# Patient Record
Sex: MALE | Race: WHITE
[De-identification: names, ages, dates, MRNs, and addresses within clinical notes are randomized per-mention and may not be internally consistent; named-entity substitution may affect disease eponyms.]

---

## 2020-03-11 ENCOUNTER — HOSPITAL ENCOUNTER (EMERGENCY)
Dept: HOSPITAL 41 - JD.ED | Age: 55
Discharge: HOME | End: 2020-03-11
Payer: COMMERCIAL

## 2020-03-11 DIAGNOSIS — J40: ICD-10-CM

## 2020-03-11 DIAGNOSIS — F17.210: ICD-10-CM

## 2020-03-11 DIAGNOSIS — J18.9: Primary | ICD-10-CM

## 2020-03-11 NOTE — CR
Chest: PA and lateral views of the chest were obtained.

 

Comparison: No prior chest x-ray is available.

 

Heart is mildly enlarged.  Increased lung markings are seen believed 

to represent pulmonary vascular congestion.  Mild asymmetric increased

 density within left lung base is seen either due to atelectasis or 

possibly very minimal early pneumonia.  Probable small bilateral 

pleural effusions.

 

Impression:

1.  Findings suspicious for mild CHF.

2.  Minimal increased density of the left base either due to 

atelectasis or minimal area of developing pneumonia.  Please correlate

 with the patient's symptoms.

 

Diagnostic code #3

 

This report was dictated in [place] Standard Time

## 2020-03-11 NOTE — EDM.PDOC
ED HPI GENERAL MEDICAL PROBLEM





- General


Chief Complaint: Respiratory Problem


Stated Complaint: SOB


Time Seen by Provider: 03/11/20 06:58


Source of Information: Reports: Patient, RN Notes Reviewed





- History of Present Illness


INITIAL COMMENTS - FREE TEXT/NARRATIVE: 





54-year-old male comes in with cough, shortness of breath.  He did have onset 

of some type of upper respiratory infection about a week ago with cough and 

what sounds like chills, possible fever.  He was ill for several days and then 

did seem to be getting better.  He states he started coughing more during the 

night and again this morning  Cough has been nonproductive.  He is concerned 

about worsening shortness of breath.  He states that he is especially more 

shortness of breath with exertion.  He does have fairly longstanding smoking 

history initially stating that he started 20 years ago but then admitted he 

started in high school which would be more like 35 years ago.  Does work 

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 

had a lot of cold and weather exposure as well.  He did not get a flu shot last 

fall.





- Related Data


 Allergies











Allergy/AdvReac Type Severity Reaction Status Date / Time


 


No Known Allergies Allergy   Verified 03/11/20 06:52











Home Meds: 


 Home Meds





. [No Known Home Meds]  03/11/20 [History]











Past Medical History





- Past Health History


Medical/Surgical History: Denies Medical/Surgical History





Social & Family History





- Tobacco Use


Smoking Status *Q: Current Some Day Smoker


Years of Tobacco use: 20


Packs/Tins Daily: 0.2





ED ROS GENERAL





- Review of Systems


Review Of Systems: See Below


Constitutional: Reports: Fever, Chills


HEENT: Denies: Rhinitis, Sinus Problem, Throat Pain


Respiratory: Reports: Shortness of Breath, Wheezing, Cough (Assubel), Sputum


Cardiovascular: Denies: Chest Pain (And)


GI/Abdominal: Denies: Abdominal Pain, Nausea, Vomiting


Skin: Reports: No Symptoms





ED EXAM, GENERAL





- Physical Exam


Exam: See Below


General Appearance: Alert, No Apparent Distress


Nose: Normal Inspection


Throat/Mouth: Normal Inspection, Normal Oropharynx


Head: Atraumatic.  No: Facial Swelling


Neck: Supple, Full Range of Motion


Respiratory/Chest: No Respiratory Distress, Lungs Clear, Normal Breath Sounds.  

No: Rhonchi, Wheezing


Cardiovascular: Tachycardia


Extremities: No: Pedal Edema, Leg Pain


Neurological: Alert, Oriented


Skin Exam: Warm, Dry, Normal Color





Course





- Vital Signs


Last Recorded V/S: 


 Last Vital Signs











Temp  97.2 F   03/11/20 06:52


 


Pulse  104 H  03/11/20 06:52


 


Resp  16   03/11/20 06:52


 


BP  142/109 H  03/11/20 06:52


 


Pulse Ox  98   03/11/20 07:21














- Orders/Labs/Meds


Orders: 


 Active Orders 24 hr











 Category Date Time Status


 


 RT Aerosol Therapy [RC] ASDIRECTED Care  03/11/20 07:13 Active


 


 CXR [Chest 2V] [CR] Stat Exams  03/11/20 07:13 Taken











Meds: 


Medications














Discontinued Medications














Generic Name Dose Route Start Last Admin





  Trade Name Whit  PRN Reason Stop Dose Admin


 


Albuterol/Ipratropium  3 ml  03/11/20 07:12  03/11/20 07:20





  Duoneb 3.0-0.5 Mg/3 Ml  NEB  03/11/20 07:13  3 ml





  ONETIME ONE   Administration





     





     





     





     














- Re-Assessments/Exams


Free Text/Narrative Re-Assessment/Exam: 





03/11/20 07:55


Chest x-ray suggestive for early pneumonia.  There is some increased infiltrate 

peribronchial on the right and also left lower lung field not actively coughing 

while I have been with him in the room.  Febrile.  Sats are good and moving air 

relatively well will prescribe an albuterol inhaler.  Strongly encouraged him 

to quit smoking.  Will treat with Zithromax and he does need follow-up in about 

1 week.





Departure





- Departure


Time of Disposition: 07:56


Disposition: Home, Self-Care 01


Clinical Impression: 


 Pneumonia, Bronchitis








- Discharge Information


Instructions:  Sepsis, Adult, Community-Acquired Pneumonia, Adult


Referrals: 


PCP,Not In Area [Primary Care Provider] - 


Forms:  ED Department Discharge, ED Return to Work/School Form


Additional Instructions: 


Albuterol inhaler 2 puffs every 4 to 6 hours as needed for wheezing or 

difficulty breathing, Zithromax antibiotic  500 milligrams today and then 250 

mg for the next 4 days.  Try very hard to stop smoking.  Vaporizer or steam as 

needed.  Follow-up at our [facility] medical clinic in about 1 week for recheck.   

Call [phone] for appointment.  Retiurn to  ED as needed.





Sepsis Event Note





- Evaluation


Sepsis Screening Result: No Definite Risk





- Focused Exam


Vital Signs: 


 Vital Signs











  Temp Pulse Resp BP Pulse Ox Pulse Ox


 


 03/11/20 07:21       98


 


 03/11/20 06:52  97.2 F  104 H  16  142/109 H  98 











Date Exam was Performed: 03/11/20


Time Exam was Performed: 08:02





- My Orders


Last 24 Hours: 


My Active Orders





03/11/20 07:13


RT Aerosol Therapy [RC] ASDIRECTED 


CXR [Chest 2V] [CR] Stat 














- Assessment/Plan


Last 24 Hours: 


My Active Orders





03/11/20 07:13


RT Aerosol Therapy [RC] ASDIRECTED 


CXR [Chest 2V] [CR] Stat

## 2020-03-31 ENCOUNTER — HOSPITAL ENCOUNTER (EMERGENCY)
Dept: HOSPITAL 41 - JD.ED | Age: 55
Discharge: HOME | End: 2020-03-31
Payer: COMMERCIAL

## 2020-03-31 DIAGNOSIS — J18.9: Primary | ICD-10-CM

## 2020-03-31 PROCEDURE — 36415 COLL VENOUS BLD VENIPUNCTURE: CPT

## 2020-03-31 PROCEDURE — 83625 ASSAY OF LDH ENZYMES: CPT

## 2020-03-31 PROCEDURE — 99285 EMERGENCY DEPT VISIT HI MDM: CPT

## 2020-03-31 PROCEDURE — 83605 ASSAY OF LACTIC ACID: CPT

## 2020-03-31 PROCEDURE — 82728 ASSAY OF FERRITIN: CPT

## 2020-03-31 PROCEDURE — 83615 LACTATE (LD) (LDH) ENZYME: CPT

## 2020-03-31 PROCEDURE — 84484 ASSAY OF TROPONIN QUANT: CPT

## 2020-03-31 PROCEDURE — 93005 ELECTROCARDIOGRAM TRACING: CPT

## 2020-03-31 PROCEDURE — 96365 THER/PROPH/DIAG IV INF INIT: CPT

## 2020-03-31 PROCEDURE — 86140 C-REACTIVE PROTEIN: CPT

## 2020-03-31 PROCEDURE — 71045 X-RAY EXAM CHEST 1 VIEW: CPT

## 2020-03-31 PROCEDURE — 96366 THER/PROPH/DIAG IV INF ADDON: CPT

## 2020-03-31 PROCEDURE — 96375 TX/PRO/DX INJ NEW DRUG ADDON: CPT

## 2020-03-31 PROCEDURE — 83880 ASSAY OF NATRIURETIC PEPTIDE: CPT

## 2020-03-31 PROCEDURE — 85652 RBC SED RATE AUTOMATED: CPT

## 2020-03-31 PROCEDURE — 80053 COMPREHEN METABOLIC PANEL: CPT

## 2020-03-31 PROCEDURE — 85025 COMPLETE CBC W/AUTO DIFF WBC: CPT

## 2020-03-31 NOTE — CR
Chest: Portable view of the chest was obtained.

 

Comparison: Prior chest x-ray of 03/26/20.

 

Slight increasing density within both lung bases is noted.  Upper 

lungs are clear.  Heart size at the upper limits of normal.  Tortuous 

thoracic aorta is noted.  Bony structures are grossly intact.

 

Impression:

1.  Increasing density within both lung bases from prior study.  

Uncertain if findings represent worsening atelectasis or represent 

bibasilar pneumonia, bacterial or viral in etiology.

2.  Other stable findings as noted above.

 

Diagnostic code #3

 

This report was dictated in MDT

## 2020-03-31 NOTE — EDM.PDOC
<Edd Kingsley - Last Filed: 03/31/20 06:22>





ED HPI GENERAL MEDICAL PROBLEM





- General


Chief Complaint: Respiratory Problem


Stated Complaint: SOB/COUGH/BOTH LEGS ARE SWOLLEN


Time Seen by Provider: 03/31/20 06:16





- History of Present Illness


INITIAL COMMENTS - FREE TEXT/NARRATIVE: 





54-year-old male presents the emergency room with increasing shortness of 

breath and worsening lower extremity edema.





The patient was seen here on the 11th thought to have pneumonia started on 

antibiotics.  He did not improve he was seen in the clinic on the 26th repeat x-

ray showed possible worsening pneumonia with cardiomegaly increased vascular 

markings.  He was treated with the benzoin 100 mg 3 times daily a Z-Talib and 

Augmentin 1000 mg twice daily.  Since then he continues to worsen he has a 

productive cough bringing up foamy white material without evidence of blood but 

it is a very frequent cough.  He is also noticed increased leg swelling.  He 

has not run fevers in quite a while he is having 3 or 4 loose stools a day and 

the first day of the second round of antibiotics he had significant nausea and 

vomited a few times.  Patient denies any chest pain other than when he coughs.  

Patient quit smoking back in February and he has an extensive smoking history.


  ** Chest


Pain Score (Numeric/FACES): 4





- Related Data


 Allergies











Allergy/AdvReac Type Severity Reaction Status Date / Time


 


No Known Allergies Allergy   Verified 03/31/20 06:20











Home Meds: 


 Home Meds





Amoxicillin/Potassium Clav [Amox-Clav ER 1,000-62.5 mg Tab]  03/31/20 [History]


Levofloxacin [Levaquin] 750 mg PO DAILY #7 tablet 03/31/20 [Rx]











Past Medical History





- Past Health History


Medical/Surgical History: Denies Medical/Surgical History





ED ROS GENERAL





- Review of Systems


Review Of Systems: See Below


Constitutional: Denies: Fever, Chills


HEENT: Reports: No Symptoms


Respiratory: Reports: Shortness of Breath, Cough, Sputum (Clear foamy sputum).  

Denies: Hemoptysis


Cardiovascular: Reports: Dyspnea on Exertion, Edema.  Denies: Chest Pain


Endocrine: Reports: No Symptoms


GI/Abdominal: Reports: Other (Some nausea and vomiting when he started the 2 

most recent antibiotics this has resolved he has some intermittent stomach 

upset he is having 3-4 loose semi-formed stools daily not having significant 

abdominal pain)


: Reports: No Symptoms


Musculoskeletal: Reports: No Symptoms


Skin: Reports: No Symptoms


Neurological: Reports: No Symptoms





ED EXAM, GENERAL





- Physical Exam


Exam: See Below


Exam Limited By: No Limitations


General Appearance: Alert, No Apparent Distress


Head: Atraumatic, Normocephalic


Neck: Normal Inspection, Supple, Non-Tender, Full Range of Motion, Other (Seen 

JVD was somewhat difficult every time I put my hand on his right upper quadrant 

he would start a cough)


Respiratory/Chest: No Respiratory Distress, Lungs Clear, Decreased Breath Sounds


Cardiovascular: Regular Rate, Rhythm, No Murmur, Other (1-2+ pitting edema 

bilateral lower extremities)


GI/Abdominal: Normal Bowel Sounds, Soft, Non-Tender


 (Male) Exam: No Hernia


Back Exam: Normal Inspection.  No: CVA Tenderness (L), CVA Tenderness (R)


Extremities: Other (Bilateral lower extremity pitting edema)


Neurological: Alert, Oriented, Normal Cognition





Course





- Vital Signs


Last Recorded V/S: 


 Last Vital Signs











Temp  98.6 F   03/31/20 06:15


 


Pulse  109 H  03/31/20 06:15


 


Resp  20   03/31/20 06:15


 


BP  110/88   03/31/20 06:15


 


Pulse Ox  99   03/31/20 06:15














- Orders/Labs/Meds


Orders: 


 Active Orders 24 hr











 Category Date Time Status


 


 EKG Documentation Completion [RC] STAT Care  03/31/20 06:48 Active


 


 LD ISOENZYMES [REF] Stat Lab  03/31/20 07:12 Received


 


 Heparin Sodium/D5W [Heparin 25,000 Units in D5W 500 ML] Med  03/31/20 08:45 

Active





 25,000 units in 500 ml IV TITRATE   








 Medication Orders





Heparin Sodium/Dextrose (Heparin 25,000 Units In D5w 500 Ml)  25,000 units in 

500 mls @ 18 mls/hr IV TITRATE BENJY; Protocol


   Last Admin: 03/31/20 08:47  Dose: 12 units/kg/hr, 18 mls/hr








Labs: 


 Laboratory Tests











  03/31/20 03/31/20 03/31/20 Range/Units





  07:12 07:12 07:12 


 


WBC   7.70   (4.23-9.07)  K/mm3


 


RBC   4.39 L   (4.63-6.08)  M/mm3


 


Hgb   14.5   (13.7-17.5)  gm/dl


 


Hct   43.8   (40.1-51.0)  %


 


MCV   99.8 H   (79.0-92.2)  fl


 


MCH   33.0 H   (25.7-32.2)  pg


 


MCHC   33.1   (32.2-35.5)  g/dl


 


RDW Std Deviation   51.4 H   (35.1-43.9)  fL


 


Plt Count   192   (163-337)  K/mm3


 


MPV   10.5   (9.4-12.3)  fl


 


Neut % (Auto)   73.1 H   (34.0-67.9)  %


 


Lymph % (Auto)   15.7 L   (21.8-53.1)  %


 


Mono % (Auto)   10.4   (5.3-12.2)  %


 


Eos % (Auto)   0.4 L   (0.8-7.0)  


 


Baso % (Auto)   0.3   (0.1-1.2)  %


 


Neut # (Auto)   5.63 H   (1.78-5.38)  K/mm3


 


Lymph # (Auto)   1.21 L   (1.32-3.57)  K/mm3


 


Mono # (Auto)   0.80   (0.30-0.82)  K/mm3


 


Eos # (Auto)   0.03 L   (0.04-0.54)  K/mm3


 


Baso # (Auto)   0.02   (0.01-0.08)  K/mm3


 


ESR     (0-15)  mm/hr


 


Sodium  135 L    (136-145)  mEq/L


 


Potassium  4.2    (3.5-5.1)  mEq/L


 


Chloride  100    ()  mEq/L


 


Carbon Dioxide  22    (21-32)  mEq/L


 


Anion Gap  17.2 H    (5-15)  


 


BUN  17    (7-18)  mg/dL


 


Creatinine  1.0    (0.7-1.3)  mg/dL


 


Est Cr Clr Drug Dosing  TNP    


 


Estimated GFR (MDRD)  > 60    (>60)  mL/min


 


BUN/Creatinine Ratio  17.0    (14-18)  


 


Glucose  103    ()  mg/dL


 


Lactic Acid     (0.4-2.0)  mmol/L


 


Calcium  9.2    (8.5-10.1)  mg/dL


 


Ferritin     ()  ng/ml


 


Total Bilirubin  1.6 H    (0.2-1.0)  mg/dL


 


AST  88 H    (15-37)  U/L


 


ALT  285 H    (16-63)  U/L


 


Alkaline Phosphatase  140 H    ()  U/L


 


Troponin I  0.108 H*    (0.00-0.056)  ng/mL


 


C-Reactive Protein     (<1.0)  mg/dL


 


NT-Pro-B Natriuret Pep    5256 H  (0-125)  pg/mL


 


Total Protein  6.3 L    (6.4-8.2)  g/dl


 


Albumin  3.4    (3.4-5.0)  g/dl


 


Globulin  2.9    gm/dL


 


Albumin/Globulin Ratio  1.2    (1-2)  














  03/31/20 03/31/20 03/31/20 Range/Units





  07:12 07:12 07:12 


 


WBC     (4.23-9.07)  K/mm3


 


RBC     (4.63-6.08)  M/mm3


 


Hgb     (13.7-17.5)  gm/dl


 


Hct     (40.1-51.0)  %


 


MCV     (79.0-92.2)  fl


 


MCH     (25.7-32.2)  pg


 


MCHC     (32.2-35.5)  g/dl


 


RDW Std Deviation     (35.1-43.9)  fL


 


Plt Count     (163-337)  K/mm3


 


MPV     (9.4-12.3)  fl


 


Neut % (Auto)     (34.0-67.9)  %


 


Lymph % (Auto)     (21.8-53.1)  %


 


Mono % (Auto)     (5.3-12.2)  %


 


Eos % (Auto)     (0.8-7.0)  


 


Baso % (Auto)     (0.1-1.2)  %


 


Neut # (Auto)     (1.78-5.38)  K/mm3


 


Lymph # (Auto)     (1.32-3.57)  K/mm3


 


Mono # (Auto)     (0.30-0.82)  K/mm3


 


Eos # (Auto)     (0.04-0.54)  K/mm3


 


Baso # (Auto)     (0.01-0.08)  K/mm3


 


ESR   13   (0-15)  mm/hr


 


Sodium     (136-145)  mEq/L


 


Potassium     (3.5-5.1)  mEq/L


 


Chloride     ()  mEq/L


 


Carbon Dioxide     (21-32)  mEq/L


 


Anion Gap     (5-15)  


 


BUN     (7-18)  mg/dL


 


Creatinine     (0.7-1.3)  mg/dL


 


Est Cr Clr Drug Dosing     


 


Estimated GFR (MDRD)     (>60)  mL/min


 


BUN/Creatinine Ratio     (14-18)  


 


Glucose     ()  mg/dL


 


Lactic Acid     (0.4-2.0)  mmol/L


 


Calcium     (8.5-10.1)  mg/dL


 


Ferritin    329  ()  ng/ml


 


Total Bilirubin     (0.2-1.0)  mg/dL


 


AST     (15-37)  U/L


 


ALT     (16-63)  U/L


 


Alkaline Phosphatase     ()  U/L


 


Troponin I     (0.00-0.056)  ng/mL


 


C-Reactive Protein  2.7 H*    (<1.0)  mg/dL


 


NT-Pro-B Natriuret Pep     (0-125)  pg/mL


 


Total Protein     (6.4-8.2)  g/dl


 


Albumin     (3.4-5.0)  g/dl


 


Globulin     gm/dL


 


Albumin/Globulin Ratio     (1-2)  














  03/31/20 03/31/20 Range/Units





  10:15 11:55 


 


WBC    (4.23-9.07)  K/mm3


 


RBC    (4.63-6.08)  M/mm3


 


Hgb    (13.7-17.5)  gm/dl


 


Hct    (40.1-51.0)  %


 


MCV    (79.0-92.2)  fl


 


MCH    (25.7-32.2)  pg


 


MCHC    (32.2-35.5)  g/dl


 


RDW Std Deviation    (35.1-43.9)  fL


 


Plt Count    (163-337)  K/mm3


 


MPV    (9.4-12.3)  fl


 


Neut % (Auto)    (34.0-67.9)  %


 


Lymph % (Auto)    (21.8-53.1)  %


 


Mono % (Auto)    (5.3-12.2)  %


 


Eos % (Auto)    (0.8-7.0)  


 


Baso % (Auto)    (0.1-1.2)  %


 


Neut # (Auto)    (1.78-5.38)  K/mm3


 


Lymph # (Auto)    (1.32-3.57)  K/mm3


 


Mono # (Auto)    (0.30-0.82)  K/mm3


 


Eos # (Auto)    (0.04-0.54)  K/mm3


 


Baso # (Auto)    (0.01-0.08)  K/mm3


 


ESR    (0-15)  mm/hr


 


Sodium    (136-145)  mEq/L


 


Potassium    (3.5-5.1)  mEq/L


 


Chloride    ()  mEq/L


 


Carbon Dioxide    (21-32)  mEq/L


 


Anion Gap    (5-15)  


 


BUN    (7-18)  mg/dL


 


Creatinine    (0.7-1.3)  mg/dL


 


Est Cr Clr Drug Dosing    


 


Estimated GFR (MDRD)    (>60)  mL/min


 


BUN/Creatinine Ratio    (14-18)  


 


Glucose    ()  mg/dL


 


Lactic Acid   1.8  (0.4-2.0)  mmol/L


 


Calcium    (8.5-10.1)  mg/dL


 


Ferritin    ()  ng/ml


 


Total Bilirubin    (0.2-1.0)  mg/dL


 


AST    (15-37)  U/L


 


ALT    (16-63)  U/L


 


Alkaline Phosphatase    ()  U/L


 


Troponin I  0.080 H*   (0.00-0.056)  ng/mL


 


C-Reactive Protein    (<1.0)  mg/dL


 


NT-Pro-B Natriuret Pep    (0-125)  pg/mL


 


Total Protein    (6.4-8.2)  g/dl


 


Albumin    (3.4-5.0)  g/dl


 


Globulin    gm/dL


 


Albumin/Globulin Ratio    (1-2)  











Meds: 


Medications











Generic Name Dose Route Start Last Admin





  Trade Name Freq  PRN Reason Stop Dose Admin


 


Heparin Sodium/Dextrose  25,000 units in 500 mls @ 18 mls/hr  03/31/20 08:45  03 /31/20 08:47





  Heparin 25,000 Units In D5w 500 Ml  IV   12 units/kg/hr





  TITRATE BENJY   18 mls/hr





     Administration





     





  Protocol   





  12 UNITS/KG/HR   














Discontinued Medications














Generic Name Dose Route Start Last Admin





  Trade Name Freq  PRN Reason Stop Dose Admin


 


Aspirin  324 mg  03/31/20 08:19  03/31/20 08:32





  Aspirin  PO  03/31/20 08:20  324 mg





  ONETIME ONE   Administration





     





     





     





     


 


Furosemide  20 mg  03/31/20 07:08  03/31/20 07:32





  Lasix  IVPUSH  03/31/20 07:09  20 mg





  ONETIME ONE   Administration





     





     





     





     


 


Heparin Sodium (Porcine)  4,000 units  03/31/20 08:33  03/31/20 08:46





  Heparin Sodium  IVPUSH  03/31/20 08:34  4,000 units





  .BOLUS ONE   Administration





     





     





     





     














- Re-Assessments/Exams


Free Text/Narrative Re-Assessment/Exam: 





03/31/20 07:18


Work-up initiated further care and disposition per Dr. Flores as it is change 

of shift.





Departure





- Departure


Disposition: Home, Self-Care 01


Clinical Impression: 


Pneumonia


Qualifiers:


 Pneumonia type: due to unspecified organism Lung location: lower lobe of lung 








- Discharge Information


Prescriptions: 


Levofloxacin [Levaquin] 750 mg PO DAILY #7 tablet


Referrals: 


PCP,None [Primary Care Provider] - 


Forms:  ED Department Discharge


Additional Instructions: 


Levaquin antibiotic, 750 mg daily for 7 days.  Albuterol inhaler 2 puff q 4 to 

6 hr as needed for cough, difficulty breathing.  Phenergan cough medication 2 

tsp q 4 to 6 hr as needed for severe cough.  This prescriptions have been sent 

electronic or faxed to  [facility].  Follow up clinic in about 

7 days, call for appointment. Return to ED as needed if sx worsening in any 

way.  





Sepsis Event Note





- Focused Exam


Vital Signs: 


 Vital Signs











  Temp Pulse Resp BP Pulse Ox


 


 03/31/20 06:15  98.6 F  109 H  20  110/88  99











Date Exam was Performed: 03/31/20


Time Exam was Performed: 06:22





- My Orders


Last 24 Hours: 


My Active Orders





03/31/20 07:12


LD ISOENZYMES [REF] Stat 





03/31/20 08:45


Heparin Sodium/D5W [Heparin 25,000 Units in D5W 500 ML] 25,000 units in 500 ml 

IV TITRATE 














- Assessment/Plan


Last 24 Hours: 


My Active Orders





03/31/20 07:12


LD ISOENZYMES [REF] Stat 





03/31/20 08:45


Heparin Sodium/D5W [Heparin 25,000 Units in D5W 500 ML] 25,000 units in 500 ml 

IV TITRATE 














<Scott Flores - Last Filed: 03/31/20 13:15>





EKG INTERPRETATION


EKG Date: 03/31/20


Rhythm: Other (sinus tach, rate 102)


Axis: LAD-Left Axis Deviation


P-Wave: Present


QRS: Other (LVH with secondary repolarization abnormality)


ST-T: Elevated (AVL, V2-V5)





Course





- Re-Assessments/Exams


Free Text/Narrative Re-Assessment/Exam: 





03/31/20 08:22.  Have assumed care from Dr Kingsley after change of shift.  I 

agree with his hx and exam as documented. CXR shows increased markings bilat 

lower lungfields etiology unclear.  WBC nl, afebrile.  He has had continued 

frequent cough for about the past 10 days.  He had cough 20 days ago that did 

get better after a Z pack.  He has had no definite fever or chills.  He has 

travel hx home to [place] 10 days ago and started getting more ill while he 

was there.  he has a very strong smoking hx.  He quit about 2 months ago.  He 

has been more short of breath than usual the last few days.  Have added CRP, 

ferritan, sed rate, LDH to his labs.  He was tested for covid 5 days ago and 

the test came back neg.  


I have ordered  PO, will start heparin bolus and drip for now.  Will do 

a 3 hr trop, see how his other labs come back and than go from there. 





03/31/20 11:25.  Repeat trop has come back at .08, improved from initial trop 

of 0.18 3 hrs prior. CRP 2.7, BNP 5256.  Clinically he is in mild CHF with 

superimposed bronchitis, viral pneumonia?  Will discuss with Hospitalist on call

, Dr Zimmerman.  








03/31/20 12:45. Have discussed with Dr Zimmerman.  She advises discharge home 

on levaquin for 1 week, albuterol inhaler. She advises no meds for CHF for now.

  will also prescribed phenergan with codeine cough medication to help him 

better rest, discharge instr. as documented. 














Departure





- Departure


Time of Disposition: 13:07


Condition: Fair





Sepsis Event Note





- Focused Exam


Date Exam was Performed: 03/31/20


Time Exam was Performed: 13:05